# Patient Record
Sex: MALE | Race: WHITE | NOT HISPANIC OR LATINO | Employment: OTHER | URBAN - METROPOLITAN AREA
[De-identification: names, ages, dates, MRNs, and addresses within clinical notes are randomized per-mention and may not be internally consistent; named-entity substitution may affect disease eponyms.]

---

## 2017-04-25 ENCOUNTER — COMMUNICATION - HEALTHEAST (OUTPATIENT)
Dept: TELEHEALTH | Facility: CLINIC | Age: 32
End: 2017-04-25

## 2017-04-25 ENCOUNTER — OFFICE VISIT - HEALTHEAST (OUTPATIENT)
Dept: FAMILY MEDICINE | Facility: CLINIC | Age: 32
End: 2017-04-25

## 2017-04-25 DIAGNOSIS — Z71.84 TRAVEL ADVICE ENCOUNTER: ICD-10-CM

## 2017-04-25 ASSESSMENT — MIFFLIN-ST. JEOR: SCORE: 1747.5

## 2021-05-30 VITALS — BODY MASS INDEX: 22.26 KG/M2 | WEIGHT: 168 LBS | HEIGHT: 73 IN

## 2021-06-10 NOTE — PROGRESS NOTES
"ASSESSMENT & PLAN:  1. Travel advice encounter  Discussion about traveling to [facility], CDC recommendations were discussed, prevention of insect bite and non  insect bite blood pathogen discussed, immunization was updated.  Patient reluctant to get the rabies vaccine even though he is planning to do some caving in [facility].  - typhoid (VIVOTIF) SR capsule; Take 1 capsule by mouth every other day for 4 days.  Dispense: 4 capsule; Refill: 0    Orders Placed This Encounter   Procedures     Hepatitis A vaccine adult IM     There are no discontinued medications.    No Follow-up on file.    CHIEF COMPLAINT:  Chief Complaint   Patient presents with     Travel Consult     [facility] OF [facility]     Establish Care     NEW PT       HISTORY OF PRESENT ILLNESS:  Ricky is a 32 y.o. male new patient presenting to the clinic today for a travel consultation. He will be going to [facility] on 5/20/2017 through 6/4/2017. 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 He will be spending time in the [facility] and in [facility] with caves and hiking. He is agreeable to Hepatitis A vaccination and oral typhoid prophylaxis. He denies Hepatitis B, Yellow Fever, or Japanese Encephalitis vaccinations, as well as malaria prophylaxis. He will take prophylactic azithromycin. He would like to call his insurance prior to Rabies vaccination.    REVIEW OF SYSTEMS:   He last had a Tdap in 2010. All other systems are negative.    PFSH:  His wife is also being seen for a travel consultation to [facility] by another provider.    TOBACCO USE:  History   Smoking Status     Never Smoker   Smokeless Tobacco     Not on file       VITALS:  Vitals:    04/25/17 1350   BP: 112/60   Patient Site: Right Arm   Pulse: 68   Resp: 13   Temp: 97.6  F (36.4  C)   TempSrc: Oral   Weight: 168 lb (76.2 kg)   Height: 6' 1.1\" (1.857 m)     Wt Readings from Last 3 Encounters:   04/25/17 168 lb (76.2 kg)     Body mass index is 22.1 kg/(m^2).    PHYSICAL " EXAM:  GENERAL:  Patient alert, in no acute distress.  NEURO:  CN II-XII intact, motor & sensory function all intact.  DTR and reflexes normal.  PSYCHIATRIC:  Alert & oriented with normal mood and affect.  Good judgment and insight.    ADDITIONAL HISTORY SUMMARIZED (2): None.  DECISION TO OBTAIN EXTRA INFORMATION (1): Accessed [facility] website regarding travel to [facility].  RADIOLOGY TESTS (1): None.  LABS (1): None.  MEDICINE TESTS (1): None.  INDEPENDENT REVIEW (2 each): None.     The visit lasted a total of 14 minutes face to face with the patient. Over 50% of the time was spent counseling and educating the patient about travel to [facility].    Julius MERRILL, am scribing for and in the presence of, Dr. Flores.    IDr. Flores, personally performed the services described in this documentation, as scribed by Julius Lea in my presence, and it is both accurate and complete.    Dragon dictation was used for this note.  Speech recognition errors are a possibility.    MEDICATIONS:  Current Outpatient Prescriptions   Medication Sig Dispense Refill     azithromycin (ZITHROMAX) 500 MG tablet Take 1 tablet (500 mg total) by mouth daily for 3 days. 3 tablet 0     typhoid (VIVOTIF) SR capsule Take 1 capsule by mouth every other day for 4 days. 4 capsule 0     No current facility-administered medications for this visit.        Total data points:1      There are no Patient Instructions on file for this visit.

## 2021-09-01 ENCOUNTER — OFFICE VISIT (OUTPATIENT)
Dept: URGENT CARE | Facility: URGENT CARE | Age: 36
End: 2021-09-01
Payer: COMMERCIAL

## 2021-09-01 VITALS
OXYGEN SATURATION: 98 % | DIASTOLIC BLOOD PRESSURE: 78 MMHG | RESPIRATION RATE: 20 BRPM | SYSTOLIC BLOOD PRESSURE: 140 MMHG | HEART RATE: 78 BPM | TEMPERATURE: 98 F

## 2021-09-01 DIAGNOSIS — H65.193 ACUTE MUCOID OTITIS MEDIA OF BOTH EARS: ICD-10-CM

## 2021-09-01 DIAGNOSIS — R21 TARGET RASH: Primary | ICD-10-CM

## 2021-09-01 DIAGNOSIS — H60.392 OTHER INFECTIVE ACUTE OTITIS EXTERNA OF LEFT EAR: ICD-10-CM

## 2021-09-01 PROCEDURE — 99204 OFFICE O/P NEW MOD 45 MIN: CPT | Performed by: FAMILY MEDICINE

## 2021-09-01 RX ORDER — DOXYCYCLINE 100 MG/1
100 CAPSULE ORAL 2 TIMES DAILY
Qty: 20 CAPSULE | Refills: 0 | Status: SHIPPED | OUTPATIENT
Start: 2021-09-01 | End: 2021-09-11

## 2021-09-01 RX ORDER — FLUTICASONE PROPIONATE 50 MCG
1-2 SPRAY, SUSPENSION (ML) NASAL DAILY
Qty: 16 G | Refills: 0 | Status: SHIPPED | OUTPATIENT
Start: 2021-09-01

## 2021-09-01 RX ORDER — NEOMYCIN SULFATE, POLYMYXIN B SULFATE, HYDROCORTISONE 3.5; 10000; 1 MG/ML; [USP'U]/ML; MG/ML
3 SOLUTION/ DROPS AURICULAR (OTIC) 4 TIMES DAILY
Qty: 10 ML | Refills: 0 | Status: SHIPPED | OUTPATIENT
Start: 2021-09-01

## 2021-09-01 RX ORDER — GUAIFENESIN 600 MG/1
1200 TABLET, EXTENDED RELEASE ORAL 2 TIMES DAILY
Qty: 30 TABLET | Refills: 0 | Status: SHIPPED | OUTPATIENT
Start: 2021-09-01

## 2021-09-01 NOTE — PROGRESS NOTES
ASSESSMENT/ PLAN:  Target rash     - doxycycline hyclate (VIBRAMYCIN) 100 MG capsule; Take 1 capsule (100 mg) by mouth 2 times daily for 10 days  - **Lyme Disease [place] with reflex to WB Serum FUTURE 14d; Future     Patient agreed to prophylactic treatment of the tick bite/  Possible Lyme disease       We discussed the limitations of Lyme Disease testing early in the disease process  and the need  for possible repeat titer in the future  or if symptoms are worsening.  Lyme testing ordered    Given patient information on Lyme disease.      Other infective acute otitis externa of left ear     - neomycin-polymyxin-hydrocortisone (CORTISPORIN) 3.5-18441-3 otic solution; Place 3 drops in ear(s) 4 times daily    Symptomatic treatment with acetaminophen or Ibuprofen as needed for pain        Acute mucoid otitis media of both ears     - fluticasone (FLONASE) 50 MCG/ACT nasal spray; Spray 1-2 sprays into both nostrils daily  - guaiFENesin (MUCINEX) 600 MG 12 hr tablet; Take 2 tablets (1,200 mg) by mouth 2 times daily        We discussed the primary importance of home cares to promote drainage and ventilation of the eustachian tubes . .  Allergy medications and steroid nasal spray help reduce swelling within the nasal tissue and may help open drainage/ ventilation passages to the eustachian tubes.  Expectorants are recommended rather than decongestants to help promote sinus drainage.        Follow up with primary clinic if not improving    -------------------------------------------------------------------------------------------------------------------------------------------------------------      SUBJECTIVE:  Chief Complaint   Patient presents with     Urgent Care     poss bug bite ASHOK BARNETT Leodan is a 36 year old male who presents to the clinic today with a target like rash with concern that it may be due to a tick bite.  No tick was removed from the skin recently  Onset of rash was 1 day(s) ago.      Rash  is sudden onset, still present and constant.   Location of the rash:  right   Upper arm.  Quality/symptoms of rash: itching and red   Symptoms are mild and rash seems to be stable.     Associated symptoms include: nothing.  No associated fevers, chills, myalgias, arthralgias, chest pain       Also has plugged sensation bilateral ears and pain in left ear canal.    PMH:  No history of chronic health conditions    ALLERGIES:  Patient has no known allergies.    MEDs  No current outpatient medications on file prior to visit.  No current facility-administered medications on file prior to visit.      Social History     Tobacco Use     Smoking status: Never Smoker   Substance Use Topics     Alcohol use: Not on file       Family History   Problem Relation Age of Onset     No Known Problems Mother      No Known Problems Father      Depression Sister      No Known Problems Brother      No Known Problems Maternal Aunt      No Known Problems Maternal Uncle      No Known Problems Paternal Aunt      No Known Problems Paternal Uncle      Breast Cancer Maternal Grandmother      Diabetes Maternal Grandfather      Vision Loss Maternal Grandfather           ROS:  CONSTITUTIONAL:NEGATIVE for fever, chills,    EYES: NEGATIVE for vision changes or irritation  RESP:NEGATIVE for significant cough or SOB  GI: NEGATIVE for nausea, abdominal pain  or change in bowel habits    EXAM:   BP (!) 140/78   Pulse 78   Temp 98  F (36.7  C)   Resp 20   SpO2 98%   GENERAL: alert, no acute distress.  SKIN: Rash description:    Distribution: localized  Location: right arm, upper    Color: red,  Lesion type: maculopapular, target like with inflammation  GENERAL APPEARANCE: healthy, alert and no distress  EYES: EOMI,  PERRL, conjunctiva clear  HENT: nose and mouth without erythema, ulcers or lesions, TM congested/bulging bilateral  Without erythema of TM and external ear canal inflamed left  NECK: supple, non-tender to palpation, no adenopathy  noted  RESP: lungs clear to auscultation - no rales, rhonchi or wheezes

## 2021-09-01 NOTE — PATIENT INSTRUCTIONS
Patient Education     External Ear Infection (Adult)    External otitis (also called  swimmer s ear ) is an infection in the ear canal. It's often caused by bacteria or fungus. It can occur a few days after water gets trapped in the ear canal (from swimming or bathing). It can also occur after cleaning too deeply in the ear canal with a cotton swab or other object. Sometimes, hair care products get into the ear canal and cause this problem.   Symptoms can include pain, fever, itching, redness, drainage, or swelling of the ear canal. Temporary hearing loss may also occur.   Home care    Don't try to clean the ear canal. This can push pus and bacteria deeper into the canal.    Use prescribed ear drops as directed. These help reduce swelling and fight the infection. If an ear wick was placed in the ear canal, apply drops right onto the end of the wick. The wick will draw the medicine into the ear canal even if it's swollen closed.    A cotton ball may be loosely placed in the outer ear to absorb any drainage.    You may use over-the-counter medicines to control pain as directed by the healthcare provider, unless another medicine was prescribed. Talk with your provider before using these medicines if you have chronic liver or kidney disease or ever had a stomach ulcer or digestive tract bleeding.    Don't allow water to get into your ear when bathing. Also don't swim until the infection has cleared.    Prevention    Keep your ears dry. This helps lower the risk of infection. Dry your ears with a towel or hair dryer after getting wet. Also, use ear plugs when swimming.    Don't stick any objects in the ear to remove wax.    Talk with your provider about using ear drops to prevent swimmer's ear in case you feel water trapped in your ear canal. You can get these drops over the counter at most drugstores. They work by removing water from the ear canal.    Follow-up care  Follow up with your healthcare provider in 1 week,  or as advised.   When to seek medical advice  Call your healthcare provider right away if any of these occur:     Ear pain becomes worse or doesn t improve after 3 days of treatment    Redness or swelling of the outer ear occurs or gets worse    Headache    Fever of 100.4 F (38 C) or higher, or as directed by your healthcare provider  Call 911  Call 911 or get immediate medical care if any of the following occur:     Seizure    Unusual drowsiness or confusion    Unusual painful or stiff neck    Amber last reviewed this educational content on 8/1/2020 2000-2021 [employer]. All rights reserved. This information is not intended as a substitute for professional medical care. Always follow your healthcare professional's instructions.           Patient Education     Lyme Disease  Lyme disease is caused by bacteria. The infection is most often passed during the bite of a deer tick. The tick is very small, so many people with Lyme disease don't know they have been bitten. Tests for Lyme disease are not always accurate early in the disease. If the disease is suspected, treatment may start before testing confirms the infection. A long course of antibiotics is the standard treatment.  If untreated, Lyme disease can cause symptoms in many parts of the body that may worsen.    Early symptoms limited to a small area may appear within a few days to a month after the tick bite. These symptoms may include a round, red rash that sometimes looks like a bull's-eye target with darker outer ring and a darker center. There may fever, chills, fatigue, body aches, and headache. In time, the rash goes away, even without treatment. That doesn't mean the infection has gone away, however. In some cases, early local symptoms never develop.    Early body-wide symptoms may appear weeks to months after the bite. These can include rashes on the skin of various parts of the body, muscle aches, fatigue, fever, headache, stiff  neck, weakness on one side of the face, dizziness, palpitations, passing out, and joint pain and swelling.    Late-stage symptoms can include weakness in an arm, or leg, headache, fever, and numbness and tingling in the arms or legs, joint pain and swelling, confusion, and memory loss.    Many people will have left over symptoms even after treatment and cure of the Lyme disease. These are called post-Lyme symptoms and may include fatigue, body aches, joint aches, and headaches, which generally improve with time. Repeated courses of antibiotics don't help these symptoms to resolve faster. And, having the symptoms after a course of treatment does not mean that the Lyme bacteria is still active in the body.  Testing is done to check for the bacteria. When the infection is treated early, it can be cured. In some cases, a second or third course of antibiotics may be needed. Be sure to follow your healthcare providers directions about treatment.  Home care  If antibiotic pills have been prescribed, take them exactly as directed until they are completely gone. Don't stop taking them until you have taken the full course or your healthcare provider has told you to stop.  Ask your healthcare provider about taking over-the-counter medicines to control symptoms such as aches and fever.  Follow-up care  Follow up with your healthcare provider, or as advised. Be sure to return for follow-up testing as directed to be sure the infection has been treated.  When to seek medical advice  Call your healthcare provider right away if any of the following occur:    Current symptoms get worse    Unexplained fever, neck pain or stiffness, or headache    Arm, leg or facial weakness    Joint pain or swelling    Numbness and tingling in the arms or legs    Confusion or memory loss    Irregular or rapid heartbeat, palpitations, dizziness, or passing out  Amber last reviewed this educational content on 3/1/2018    1160-7280 [employer]. All rights reserved. This information is not intended as a substitute for professional medical care. Always follow your healthcare professional's instructions.

## 2021-10-02 ENCOUNTER — LAB (OUTPATIENT)
Dept: LAB | Facility: CLINIC | Age: 36
End: 2021-10-02
Payer: COMMERCIAL

## 2021-10-02 DIAGNOSIS — R21 TARGET RASH: ICD-10-CM

## 2021-10-02 PROCEDURE — 86618 LYME DISEASE ANTIBODY: CPT

## 2021-10-02 PROCEDURE — 36415 COLL VENOUS BLD VENIPUNCTURE: CPT

## 2021-10-04 LAB — B BURGDOR IGG+IGM SER QL: 0.3

## 2021-10-16 ENCOUNTER — HEALTH MAINTENANCE LETTER (OUTPATIENT)
Age: 36
End: 2021-10-16

## 2022-10-01 ENCOUNTER — HEALTH MAINTENANCE LETTER (OUTPATIENT)
Age: 37
End: 2022-10-01

## 2023-02-04 ENCOUNTER — HEALTH MAINTENANCE LETTER (OUTPATIENT)
Age: 38
End: 2023-02-04

## 2024-03-03 ENCOUNTER — HEALTH MAINTENANCE LETTER (OUTPATIENT)
Age: 39
End: 2024-03-03